# Patient Record
Sex: MALE | Race: WHITE | NOT HISPANIC OR LATINO | Employment: FULL TIME | URBAN - NONMETROPOLITAN AREA
[De-identification: names, ages, dates, MRNs, and addresses within clinical notes are randomized per-mention and may not be internally consistent; named-entity substitution may affect disease eponyms.]

---

## 2024-07-29 ENCOUNTER — HOSPITAL ENCOUNTER (EMERGENCY)
Facility: HOSPITAL | Age: 24
Discharge: HOME | End: 2024-07-29
Admitting: EMERGENCY MEDICINE
Payer: COMMERCIAL

## 2024-07-29 VITALS
DIASTOLIC BLOOD PRESSURE: 78 MMHG | RESPIRATION RATE: 18 BRPM | TEMPERATURE: 98 F | HEIGHT: 74 IN | SYSTOLIC BLOOD PRESSURE: 128 MMHG | BODY MASS INDEX: 26.95 KG/M2 | HEART RATE: 74 BPM | WEIGHT: 210 LBS | OXYGEN SATURATION: 98 %

## 2024-07-29 DIAGNOSIS — S01.01XA LACERATION OF SCALP, INITIAL ENCOUNTER: Primary | ICD-10-CM

## 2024-07-29 PROCEDURE — 99282 EMERGENCY DEPT VISIT SF MDM: CPT

## 2024-07-29 NOTE — ED PROVIDER NOTES
Pt Name: Juan Jose Winslow  MRN: [id number]  : 2000  Date of Encounter: 2024    PCP: Provider, No Known      Subjective    History of Present Illness:    Chief Complaint: Scalp laceration    History of Present Illness: Juan Jose Winslow is a 23 y.o. male who presents to the ER complaining of scalp laceration that started approximately 20 minutes prior to arrival.  Patient states he was walking around hit into a corner of a wall causing a laceration on the top part of his scalp.  Patient denies any loss of consciousness, nausea or vomiting pain is described as Dull, Throbbing, and does to radiate   Patient rates pain as a 5 on a ten scale.    Triage Vitals:    ED Triage Vitals [24 1749]   Temp Heart Rate Resp BP SpO2   98 °F (36.7 °C) 74 18 128/78 98 %      Temp src Heart Rate Source Patient Position BP Location FiO2 (%)   Oral Monitor -- Left arm --       Nurses Notes reviewed and agree, including vitals, allergies, social history and prior medical history.     Patient has no known allergies.    No past medical history on file.    No past surgical history on file.    Social History     Socioeconomic History    Marital status: Single       No family history on file.    REVIEW OF SYSTEMS:     All systems reviewed and not pertinent unless noted.    Review of Systems   Skin:  Positive for wound.   All other systems reviewed and are negative.      Objective    Physical Exam  Vitals and nursing note reviewed.   Constitutional:       Appearance: Normal appearance.   HENT:      Head: Normocephalic.        Comments: Scalp laceration less than 1 cm on right upper lateral  Eyes:      Extraocular Movements: Extraocular movements intact.      Pupils: Pupils are equal, round, and reactive to light.   Cardiovascular:      Rate and Rhythm: Normal rate and regular rhythm.      Pulses: Normal pulses.      Heart sounds: Normal heart sounds.   Pulmonary:      Effort: Pulmonary effort is normal.      Breath sounds: Normal breath  sounds.   Abdominal:      General: Bowel sounds are normal.      Palpations: Abdomen is soft.   Musculoskeletal:         General: Normal range of motion.      Cervical back: Normal range of motion and neck supple.   Skin:     General: Skin is warm and dry.      Capillary Refill: Capillary refill takes less than 2 seconds.   Neurological:      Mental Status: He is alert.      GCS: GCS eye subscore is 4. GCS verbal subscore is 5. GCS motor subscore is 6.      Sensory: Sensation is intact.      Motor: Motor function is intact.   Psychiatric:         Attention and Perception: Attention and perception normal.         Mood and Affect: Mood and affect normal.         Speech: Speech normal.                           Laceration Repair    Date/Time: 7/29/2024 7:47 PM    Performed by: Moisés Sawyer APRN  Authorized by: Moisés Sawyer APRN    Consent:     Consent obtained:  Verbal    Consent given by:  Patient    Risks, benefits, and alternatives were discussed: yes      Risks discussed:  Infection, pain, retained foreign body, need for additional repair, poor cosmetic result and poor wound healing    Alternatives discussed:  No treatment, delayed treatment and observation  Universal protocol:     Procedure explained and questions answered to patient or proxy's satisfaction: yes      Relevant documents present and verified: yes      Site/side marked: yes      Immediately prior to procedure, a time out was called: yes      Patient identity confirmed:  Verbally with patient  Anesthesia:     Anesthesia method:  None  Laceration details:     Location:  Scalp    Scalp location:  Mid-scalp    Length (cm):  1  Treatment:     Area cleansed with:  Saline    Amount of cleaning:  Standard  Skin repair:     Repair method:  Staples    Number of staples:  1  Approximation:     Approximation:  Close  Repair type:     Repair type:  Simple  Post-procedure details:     Dressing:  Open (no dressing)    Procedure completion:   Tolerated      ED Course:    No orders to display            Orders placed during this visit:    Orders Placed This Encounter   Procedures    Laceration Repair       LAB Results:    Lab Results (last 24 hours)       ** No results found for the last 24 hours. **             If labs were ordered, I have independently reviewed the results and considered them in the diagnosis and treatment plan for the patient    RADIOLOGY    No radiology results from the last 24 hrs     If I have ordered, I have independently reviewed the above noted radiographic studies.  Please see the radiologist dictation for the official interpretation    Medications given to patient in the ER    Medications - No data to display    AS OF 19:49 EDT VITALS:    BP - 128/78  HR - 74  TEMP - 98 °F (36.7 °C) (Oral)  O2 SATS - 98%         Shared Decision Making: After my consideration of the clinical presentation and laboratory/radiology studies obtained, I have discussed the findings with the patient/patient representative who is in agreement with the treatment plan and final disposition. Risks and benefits of discharge and/or observation admission were discussed.  Final disposition of the patient will be discharged home.  Patient is requested to follow-up with primary care provider and specialist in 1 week following final discharge.    Discharge Medications Prescribed:  No new home medications were prescribed during this ER visit    Medical Decision Making  Juan Jose Winslow is a 23 y.o. male who presents to the ER complaining of scalp laceration that started approximately 20 minutes prior to arrival.  Patient states he was walking around hit into a corner of a wall causing a laceration on the top part of his scalp.  Patient denies any loss of consciousness, nausea or vomiting pain is described as Dull, Throbbing, and does to radiate   Patient rates pain as a 5 on a ten scale.    DDX: includes but is not limited to: Scalp laceration    Problems  Addressed:  Laceration of scalp, initial encounter: acute illness or injury    Amount and/or Complexity of Data Reviewed  Discussion of management or test interpretation with external provider(s): Discussed assessment, treatment and plan with ER attending    Risk  Risk Details: I have discussed with patient the finding of the test preformed today. Patient has been diagnosed with scalp laceration and will be discharged home.  Patient requested to follow-up with primary care provider within the next 7 days for reevaluation. Strict return precautions have been given and patient verbalizes understanding        Final diagnoses:   Laceration of scalp, initial encounter       Please note that portions of this document were completed using voice recognition dictation software.       Moisés Sawyer, APRN  07/29/24 1949